# Patient Record
Sex: MALE | Race: WHITE
[De-identification: names, ages, dates, MRNs, and addresses within clinical notes are randomized per-mention and may not be internally consistent; named-entity substitution may affect disease eponyms.]

---

## 2018-01-04 ENCOUNTER — HOSPITAL ENCOUNTER (OUTPATIENT)
Dept: HOSPITAL 92 - TBSIIMAG | Age: 78
Discharge: HOME | End: 2018-01-04
Attending: NEUROLOGICAL SURGERY
Payer: MEDICARE

## 2018-01-04 DIAGNOSIS — M47.12: Primary | ICD-10-CM

## 2018-01-04 PROCEDURE — 72040 X-RAY EXAM NECK SPINE 2-3 VW: CPT

## 2018-01-04 NOTE — RAD
CERVICAL SPINE THREE VIEWS:

 

History: Follow up neck surgery. Numbness and tingling. Spondylosis. 

 

FINDINGS: 

Anterior plate and screws transfix C3, C4, C5, and C6. Interbody implants are noted at these levels. 
The anterior plate does not adhere firmly to the anterior cortex of C3. The plate adheres firmly to t
he anterior cortex of the other vertebrae. Implants appear adequately positioned. Posterior alignment
 is normally maintained. Vertebral body height is preserved. Mild degenerative changes. 

 

IMPRESSION: 

Post-operative changes as described. 

 

POS: OFF

## 2018-01-06 ENCOUNTER — HOSPITAL ENCOUNTER (EMERGENCY)
Dept: HOSPITAL 92 - ERS | Age: 78
Discharge: HOME | End: 2018-01-06
Payer: MEDICARE

## 2018-01-06 DIAGNOSIS — R53.1: Primary | ICD-10-CM

## 2018-01-06 DIAGNOSIS — K21.9: ICD-10-CM

## 2018-01-06 DIAGNOSIS — Z85.46: ICD-10-CM

## 2018-01-06 DIAGNOSIS — E78.5: ICD-10-CM

## 2018-01-06 LAB
ALBUMIN SERPL BCG-MCNC: 3.5 G/DL (ref 3.4–4.8)
ALP SERPL-CCNC: 71 U/L (ref 40–150)
ALT SERPL W P-5'-P-CCNC: 14 U/L (ref 8–55)
ANION GAP SERPL CALC-SCNC: 13 MMOL/L (ref 10–20)
AST SERPL-CCNC: 17 U/L (ref 5–34)
BASOPHILS # BLD AUTO: 0.1 THOU/UL (ref 0–0.2)
BASOPHILS NFR BLD AUTO: 0.7 % (ref 0–1)
BILIRUB SERPL-MCNC: 0.5 MG/DL (ref 0.2–1.2)
BUN SERPL-MCNC: 14 MG/DL (ref 8.4–25.7)
CALCIUM SERPL-MCNC: 9.7 MG/DL (ref 7.8–10.44)
CHLORIDE SERPL-SCNC: 103 MMOL/L (ref 98–107)
CK MB SERPL-MCNC: 1.8 NG/ML (ref 0–6.6)
CK SERPL-CCNC: 56 U/L (ref 30–200)
CO2 SERPL-SCNC: 27 MMOL/L (ref 23–31)
CREAT CL PREDICTED SERPL C-G-VRATE: 0 ML/MIN (ref 70–130)
EOSINOPHIL # BLD AUTO: 0.1 THOU/UL (ref 0–0.7)
EOSINOPHIL NFR BLD AUTO: 1.4 % (ref 0–10)
GLOBULIN SER CALC-MCNC: 3.4 G/DL (ref 2.4–3.5)
GLUCOSE SERPL-MCNC: 134 MG/DL (ref 83–110)
HGB BLD-MCNC: 12.6 G/DL (ref 14–18)
LYMPHOCYTES # BLD: 0.8 THOU/UL (ref 1.2–3.4)
LYMPHOCYTES NFR BLD AUTO: 11.2 % (ref 21–51)
MCH RBC QN AUTO: 33.1 PG (ref 27–31)
MCV RBC AUTO: 99.6 FL (ref 80–94)
MONOCYTES # BLD AUTO: 0.5 THOU/UL (ref 0.11–0.59)
MONOCYTES NFR BLD AUTO: 7.2 % (ref 0–10)
NEUTROPHILS # BLD AUTO: 5.8 THOU/UL (ref 1.4–6.5)
NEUTROPHILS NFR BLD AUTO: 79.5 % (ref 42–75)
PLATELET # BLD AUTO: 473 THOU/UL (ref 130–400)
POTASSIUM SERPL-SCNC: 4.2 MMOL/L (ref 3.5–5.1)
RBC # BLD AUTO: 3.81 MILL/UL (ref 4.7–6.1)
SODIUM SERPL-SCNC: 139 MMOL/L (ref 136–145)
SP GR UR STRIP: 1.01 (ref 1–1.04)
TROPONIN I SERPL DL<=0.01 NG/ML-MCNC: (no result) NG/ML (ref ?–0.03)
WBC # BLD AUTO: 7.3 THOU/UL (ref 4.8–10.8)

## 2018-01-06 PROCEDURE — 81003 URINALYSIS AUTO W/O SCOPE: CPT

## 2018-01-06 PROCEDURE — 94760 N-INVAS EAR/PLS OXIMETRY 1: CPT

## 2018-01-06 PROCEDURE — 93005 ELECTROCARDIOGRAM TRACING: CPT

## 2018-01-06 PROCEDURE — 85025 COMPLETE CBC W/AUTO DIFF WBC: CPT

## 2018-01-06 PROCEDURE — 82553 CREATINE MB FRACTION: CPT

## 2018-01-06 PROCEDURE — 82550 ASSAY OF CK (CPK): CPT

## 2018-01-06 PROCEDURE — 71045 X-RAY EXAM CHEST 1 VIEW: CPT

## 2018-01-06 PROCEDURE — 36415 COLL VENOUS BLD VENIPUNCTURE: CPT

## 2018-01-06 PROCEDURE — 84484 ASSAY OF TROPONIN QUANT: CPT

## 2018-01-06 PROCEDURE — 80053 COMPREHEN METABOLIC PANEL: CPT

## 2018-01-06 NOTE — RAD
PORTABLE CHEST:

 

HISTORY: 

Weakness.  

 

FINDINGS: 

The lungs appear clear.  No vascular congestion or infiltrate seen.  There is a density seen through 
the cardiac silhouette suggesting a diaphragmatic hernia.  

 

IMPRESSION: 

No acute lung process.  Density seen through the cardiac silhouette suggests a diaphragmatic hernia. 
 This could be confirmed with 2-viedw exam.

 

POS: HERBIE

## 2018-02-13 ENCOUNTER — HOSPITAL ENCOUNTER (OUTPATIENT)
Dept: HOSPITAL 92 - TBSIIMAG | Age: 78
Discharge: HOME | End: 2018-02-13
Attending: NEUROLOGICAL SURGERY
Payer: MEDICARE

## 2018-02-13 DIAGNOSIS — Z98.890: ICD-10-CM

## 2018-02-13 DIAGNOSIS — G95.89: Primary | ICD-10-CM

## 2018-02-13 PROCEDURE — 72040 X-RAY EXAM NECK SPINE 2-3 VW: CPT

## 2018-02-13 NOTE — RAD
CERVICAL SPINE 

AP AND LATERAL STANDARD

2/13/18

 

HISTORY: 

G95.9 - cervical myelopathy.

 

COMPARISON:  

Cervical spine 1/4/18.

 

FINDINGS:  

There is a right posterior spinal fusion at C6-3. An ACDF at C3-C6. Mild degenerative narrowing at C2
-3. No evidence of hardware failure. No migration of the disc spacers. 

 

Laminectomy changes are C3-4. 

 

IMPRESSION:  

Intact hardware without complication. 

 

POS: ELEANOR

## 2018-05-15 ENCOUNTER — HOSPITAL ENCOUNTER (OUTPATIENT)
Dept: HOSPITAL 92 - TBSIIMAG | Age: 78
Discharge: HOME | End: 2018-05-15
Attending: NEUROLOGICAL SURGERY
Payer: MEDICARE

## 2018-05-15 DIAGNOSIS — G95.9: Primary | ICD-10-CM

## 2018-05-15 DIAGNOSIS — Z98.1: ICD-10-CM

## 2018-05-15 PROCEDURE — 72040 X-RAY EXAM NECK SPINE 2-3 VW: CPT

## 2018-05-15 NOTE — RAD
CERVICAL SPINE TWO VIEWS:

 

History: Cervical spine surgery. 

 

Comparison: 2-13-18

 

FINDINGS: 

AP and lateral views of the cervical spine were obtained and demonstrate an ACDF plate and screws fus
ing the C3, C4, C5, and C6 levels. Right sided pedicle screws seen fusing the posterior aspect of the
 C3 and C4 levels. The patient has had posterior laminectomy at C3 and C4. Cervical spine appearance 
is stable. Plates and screws in good position, unchanged since the previous exam. 

 

IMPRESSION: 

Anterior and posterior cervical spine fusion. No significant interval change is seen. 

 

POS: ELEANOR